# Patient Record
Sex: FEMALE | Employment: UNEMPLOYED | URBAN - METROPOLITAN AREA
[De-identification: names, ages, dates, MRNs, and addresses within clinical notes are randomized per-mention and may not be internally consistent; named-entity substitution may affect disease eponyms.]

---

## 2019-12-10 ENCOUNTER — APPOINTMENT (OUTPATIENT)
Dept: ULTRASOUND IMAGING | Facility: CLINIC | Age: 29
End: 2019-12-10
Attending: EMERGENCY MEDICINE

## 2019-12-10 ENCOUNTER — HOSPITAL ENCOUNTER (EMERGENCY)
Facility: CLINIC | Age: 29
Discharge: HOME | End: 2019-12-10
Attending: EMERGENCY MEDICINE | Admitting: EMERGENCY MEDICINE

## 2019-12-10 VITALS
HEART RATE: 114 BPM | WEIGHT: 168 LBS | TEMPERATURE: 98.4 F | RESPIRATION RATE: 16 BRPM | DIASTOLIC BLOOD PRESSURE: 68 MMHG | OXYGEN SATURATION: 99 % | SYSTOLIC BLOOD PRESSURE: 135 MMHG

## 2019-12-10 DIAGNOSIS — O20.9 VAGINAL BLEEDING IN PREGNANCY, FIRST TRIMESTER: ICD-10-CM

## 2019-12-10 LAB
ANION GAP SERPL CALCULATED.3IONS-SCNC: 2 MMOL/L (ref 3–14)
B-HCG SERPL-ACNC: ABNORMAL IU/L (ref 0–5)
BASOPHILS # BLD AUTO: 0 10E9/L (ref 0–0.2)
BASOPHILS NFR BLD AUTO: 0.5 %
BUN SERPL-MCNC: 5 MG/DL (ref 7–30)
CALCIUM SERPL-MCNC: 9.1 MG/DL (ref 8.5–10.1)
CHLORIDE SERPL-SCNC: 98 MMOL/L (ref 94–109)
CO2 SERPL-SCNC: 30 MMOL/L (ref 20–32)
CREAT SERPL-MCNC: 0.57 MG/DL (ref 0.52–1.04)
DIFFERENTIAL METHOD BLD: NORMAL
EOSINOPHIL # BLD AUTO: 0.1 10E9/L (ref 0–0.7)
EOSINOPHIL NFR BLD AUTO: 0.7 %
ERYTHROCYTE [DISTWIDTH] IN BLOOD BY AUTOMATED COUNT: 12.8 % (ref 10–15)
GFR SERPL CREATININE-BSD FRML MDRD: >90 ML/MIN/{1.73_M2}
GLUCOSE SERPL-MCNC: 153 MG/DL (ref 70–99)
HCT VFR BLD AUTO: 42.6 % (ref 35–47)
HGB BLD-MCNC: 14.1 G/DL (ref 11.7–15.7)
IMM GRANULOCYTES # BLD: 0 10E9/L (ref 0–0.4)
IMM GRANULOCYTES NFR BLD: 0.5 %
LYMPHOCYTES # BLD AUTO: 1.8 10E9/L (ref 0.8–5.3)
LYMPHOCYTES NFR BLD AUTO: 21.1 %
MCH RBC QN AUTO: 28.6 PG (ref 26.5–33)
MCHC RBC AUTO-ENTMCNC: 33.1 G/DL (ref 31.5–36.5)
MCV RBC AUTO: 86 FL (ref 78–100)
MONOCYTES # BLD AUTO: 0.6 10E9/L (ref 0–1.3)
MONOCYTES NFR BLD AUTO: 7.1 %
NEUTROPHILS # BLD AUTO: 6.1 10E9/L (ref 1.6–8.3)
NEUTROPHILS NFR BLD AUTO: 70.1 %
NRBC # BLD AUTO: 0 10*3/UL
NRBC BLD AUTO-RTO: 0 /100
PLATELET # BLD AUTO: 214 10E9/L (ref 150–450)
POTASSIUM SERPL-SCNC: 3.7 MMOL/L (ref 3.4–5.3)
RBC # BLD AUTO: 4.93 10E12/L (ref 3.8–5.2)
SODIUM SERPL-SCNC: 130 MMOL/L (ref 133–144)
WBC # BLD AUTO: 8.7 10E9/L (ref 4–11)

## 2019-12-10 PROCEDURE — 80048 BASIC METABOLIC PNL TOTAL CA: CPT | Performed by: EMERGENCY MEDICINE

## 2019-12-10 PROCEDURE — 84702 CHORIONIC GONADOTROPIN TEST: CPT | Performed by: EMERGENCY MEDICINE

## 2019-12-10 PROCEDURE — 76801 OB US < 14 WKS SINGLE FETUS: CPT

## 2019-12-10 PROCEDURE — 85025 COMPLETE CBC W/AUTO DIFF WBC: CPT | Performed by: EMERGENCY MEDICINE

## 2019-12-10 PROCEDURE — 99284 EMERGENCY DEPT VISIT MOD MDM: CPT | Mod: Z6 | Performed by: EMERGENCY MEDICINE

## 2019-12-10 PROCEDURE — 99284 EMERGENCY DEPT VISIT MOD MDM: CPT | Mod: 25

## 2019-12-10 RX ORDER — CHOLECALCIFEROL (VITAMIN D3) 50 MCG
1 TABLET ORAL DAILY
COMMUNITY

## 2019-12-10 RX ORDER — MECLIZINE HYDROCHLORIDE 25 MG/1
25 TABLET ORAL 3 TIMES DAILY PRN
COMMUNITY

## 2019-12-10 RX ORDER — FOLIC ACID 1 MG/1
1 TABLET ORAL DAILY
COMMUNITY

## 2019-12-10 ASSESSMENT — ENCOUNTER SYMPTOMS
EYES NEGATIVE: 1
PSYCHIATRIC NEGATIVE: 1
RESPIRATORY NEGATIVE: 1
NEUROLOGICAL NEGATIVE: 1
HEMATOLOGIC/LYMPHATIC NEGATIVE: 1
CONSTITUTIONAL NEGATIVE: 1
MUSCULOSKELETAL NEGATIVE: 1
ALLERGIC/IMMUNOLOGIC NEGATIVE: 1
GASTROINTESTINAL NEGATIVE: 1
CARDIOVASCULAR NEGATIVE: 1
ENDOCRINE NEGATIVE: 1

## 2019-12-10 NOTE — ED AVS SNAPSHOT
[facility] Emergency Department  [address] [address]  Phone:  [phone]  Fax:  [phone]                                    Rissa Spencer   MRN: [id number]    Department:  [facility] Emergency Department   Date of Visit:  12/10/2019           After Visit Summary Signature Page    I have received my discharge instructions, and my questions have been answered. I have discussed any challenges I see with this plan with the nurse or doctor.    ..........................................................................................................................................  Patient/Patient Representative Signature      ..........................................................................................................................................  Patient Representative Print Name and Relationship to Patient    ..................................................               ................................................  Date                                   Time    ..........................................................................................................................................  Reviewed by Signature/Title    ...................................................              ..............................................  Date                                               Time          22EPIC Rev 08/18

## 2019-12-10 NOTE — ED NOTES
approx  9 weeks preg with vaginal bleeding last week, and again today about 1 hr PTA. MD at bedside.

## 2019-12-10 NOTE — ED PROVIDER NOTES
History     Chief Complaint   Patient presents with     Vaginal Bleeding     patient states she is 9weeks pregnant with bleeding     HPI  Josephine Peñaloza is a 29 year old female who presents to the emergency room for vaginal bleeding. Patient states that she is 9 weeks pregnant with twins and started bleeding 1 hour ago. Patient states that she had a moderate amount of bleeding. Patient states that she was seen at [facility] on Wednesday for same complaints and states that the bleeding stopped after Wednesday and started again today. Patient had an ultrasound when she was seen at [facility] that was normal. Patient denies nausea, vomiting, abdominal pain, chest pain, SOB or any other complaints at this time. G-5,P-2, AB-2. Patient's blood type is A-positive.  History was limited due to language barrier.  She is Armenian-speaking originally from [place] accompanied by private car with her sister-in-law Magdalena and father of mitch Salomon.  Patient has been assessed at the [facility] and is in the process of establishing care with OB/GYN as she reports she is carrying twins.      Social History: Patient presents to ED via private vehicle. Patient lives in [place], [place]    Allergies:  No Known Allergies    Problem List:    There are no active problems to display for this patient.       Past Medical History:    No past medical history on file.    Past Surgical History:    No past surgical history on file.    Family History:    No family history on file.    Social History:  Marital Status:   [2]  Social History     Tobacco Use     Smoking status: Not on file   Substance Use Topics     Alcohol use: Not on file     Drug use: Not on file        Medications:    folic acid (FOLVITE) 1 MG tablet  meclizine (ANTIVERT) 25 MG tablet  vitamin D3 (CHOLECALCIFEROL) 2000 units (50 mcg) tablet          Review of Systems   Constitutional: Negative.    HENT: Negative.    Eyes: Negative.    Respiratory: Negative.     Cardiovascular: Negative.    Gastrointestinal: Negative.    Endocrine: Negative.    Genitourinary: Positive for vaginal bleeding.   Musculoskeletal: Negative.    Skin: Negative.    Allergic/Immunologic: Negative.    Neurological: Negative.    Hematological: Negative.    Psychiatric/Behavioral: Negative.    All other systems reviewed and are negative.      Physical Exam   BP: 135/68  Pulse: 114  Temp: 98.4  F (36.9  C)  Resp: 16  Weight: 76.2 kg (168 lb)  SpO2: 99 %      Physical Exam  Exam conducted with a chaperone present.   Constitutional:       General: She is not in acute distress.     Appearance: She is not ill-appearing, toxic-appearing or diaphoretic.   HENT:      Head: Normocephalic and atraumatic.      Right Ear: There is no impacted cerumen.      Left Ear: There is no impacted cerumen.      Nose: Nose normal. No congestion or rhinorrhea.      Mouth/Throat:      Mouth: Mucous membranes are moist.      Pharynx: No oropharyngeal exudate or posterior oropharyngeal erythema.   Eyes:      General: No scleral icterus.        Right eye: No discharge.         Left eye: No discharge.      Extraocular Movements: Extraocular movements intact.      Conjunctiva/sclera: Conjunctivae normal.      Pupils: Pupils are equal, round, and reactive to light.   Neck:      Musculoskeletal: Normal range of motion. No neck rigidity or muscular tenderness.      Vascular: No carotid bruit.   Cardiovascular:      Rate and Rhythm: Normal rate.      Heart sounds: Normal heart sounds. No murmur. No friction rub. No gallop.    Pulmonary:      Effort: No respiratory distress.      Breath sounds: No stridor. No wheezing, rhonchi or rales.   Chest:      Chest wall: No tenderness.   Abdominal:      General: Abdomen is flat. There is no distension.      Palpations: There is no mass.      Tenderness: There is no abdominal tenderness. There is no right CVA tenderness, left CVA tenderness, guarding or rebound.      Hernia: No hernia is present.    Genitourinary:     General: Normal vulva.      Labia:         Right: No rash, tenderness, lesion or injury.         Left: No rash, tenderness, lesion or injury.       Vagina: No foreign body. Bleeding (scant) present. No tenderness or lesions.      Cervix: Cervical bleeding present. No discharge, lesion or erythema.   Musculoskeletal:         General: No swelling, tenderness, deformity or signs of injury.      Right lower leg: No edema.      Left lower leg: No edema.   Lymphadenopathy:      Cervical: No cervical adenopathy.   Skin:     Capillary Refill: Capillary refill takes less than 2 seconds.      Coloration: Skin is not jaundiced or pale.      Findings: No bruising, erythema, lesion or rash.   Neurological:      Mental Status: She is alert.      Cranial Nerves: No cranial nerve deficit.      Sensory: No sensory deficit.      Motor: No weakness.      Coordination: Coordination normal.      Gait: Gait normal.      Deep Tendon Reflexes: Reflexes normal.   Psychiatric:         Mood and Affect: Mood normal.         Behavior: Behavior normal.         Thought Content: Thought content normal.         Judgment: Judgment normal.         ED Course        Procedures               Critical Care time:  none               ED medications: none      ED Vitals:  Vitals:    12/10/19 1745 12/10/19 1812   BP: 135/68    Pulse: 114    Resp: 16    Temp: 98.4  F (36.9  C)    TempSrc: Oral    SpO2: 99%    Weight:  76.2 kg (168 lb)     Prior or recent diagnostic imaging and work-up:  Sherlyn Butler MD     12/4/2019  3:03 AM  [facility]  Point of Care Ultrasound Interpretation    POC US OB FIRST TRIMESTER  Date/Time: 12/4/2019 3:03 AM  Performed by: Sherlyn Butler MD  Authorized by: Gonzalez Loaiza MD      Point of Care Ultrasound: Obstetrics- Emergency Medicine    Indications: Vaginal Bleeding    Window: Adequate for full imaging and interpretation    Finding/ Impression (Within the context of limited point-of-care   ultrasound):  Findings consistent with: two gestational sacs both with   cardiac activity    MRN: [id number]  Rissa Spencer  Obstetrics Report Date: 2019         [facility]ound  [address] [place] [address]  Dept Phone: [phone]  Dept Fax: [phone]      Patient Information      MRN: [id number]  Name: Rissa Spencer  : 1990 (29 y.o.) (F)   Date: 2019 11:48 AM      Performed By    Performed by: Marry Soto RDMS  Attending: Magdiel Betancourt MD Referred by: AMY Trammell CNM  Referring location: SP      Procedure   OBGYN FIRST TRIMESTER ULTRASOUND     Indications   Establish gestational age, ultrasound  Dichorionic diamniotic twin pregnancy in first trimester     Gestational Age   LMP: 10/02/2019  GA by Today's US: Fetus 1: 7w5d  Fetus 2: 7w5d    Working GA: 7w2d Working PETER: 2020, by Last Menstrual Period       Maternal Evaluation   Cervix Normal   Approach Transabdominal, Endovaginal   Endovaginal scan done to better assess Early gestation   Cul-de-sac No fluid seen   Uterus Normal   Right Ovary Normal   Left Ovary Normal   Right Adnexa Normal   Left Adnexa Normal                             Ultrasound Findings   Fetus 1  Fetal Evaluation   Cardiac Activity Present   Chorionicity Dichorionic   Amnionicity Diamniotic   Gestation Type Twin pregnancy                  First Trimester   Gestational Sac Normal   Fetal Pole Present   Yolk Sac Seen                Biometry  Fetal Heart Rate:   153 bpm       Yolk Sac 0.21 cm     CRL 1.4 cm > 99% 7w5d     Estimated FW: No EFW calculated for this study                                                                               Fetus 2  Fetal Evaluation   Cardiac Activity Present   Chorionicity Dichorionic   Amnionicity Diamniotic   Gestation Type Twin pregnancy                  First Trimester   Gestational Sac Normal   Fetal Pole Present   Yolk Sac Seen                Biometry  Fetal Heart Rate:       Yolk Sac 0.39 cm     CRL 1.4 cm > 99% 7w5d     Estimated FW: No EFW calculated for this study                                                                         Sonographer Comments   GA by Ultrasound is Fetus 1: 7w5d  Fetus 2: 7w5d +/- 5 days. (PETER by Ultrasound is 7/5/20)  Clinical PETER Estimated Date of Delivery: 7/8/20 (by LMP 10/2/19) = 7w2d      Impression   Diamnionic/Dichorionic twin live Intrauterine pregnancy at Fetus 1: 7w5d  Fetus 2: 7w5d +/- 5 days by today's ultrasound.   PETER by ultrasound today is 7/5/20, which is consistent with LMP Estimated   Date of Delivery: 7/8/20.  Recommend using PETER of 7/8/20.   Normal appearing uterus and adnexa.     Please refer to updated Dating section.   Primary provider to review final PETER.   Diamnionic/Dichorionic twins     Recommendations   The patient is informed of the ultrasound findings by the physician and   will follow up with her obstetrical provider.      Thank you for sharing with us in the care of Rissa Spencer.   Please do not hesitate to call if you have any questions at the following   numbers:  [place] [phone], [place] [phone], [place] call [phone]     Magdiel Betancourt MD      ED Labs and imaging:  Results for orders placed or performed during the hospital encounter of 12/10/19 (from the past 24 hour(s))   CBC with platelets differential   Result Value Ref Range    WBC 8.7 4.0 - 11.0 10e9/L    RBC Count 4.93 3.8 - 5.2 10e12/L    Hemoglobin 14.1 11.7 - 15.7 g/dL    Hematocrit 42.6 35.0 - 47.0 %    MCV 86 78 - 100 fl    MCH 28.6 26.5 - 33.0 pg    MCHC 33.1 31.5 - 36.5 g/dL    RDW 12.8 10.0 - 15.0 %    Platelet Count 214 150 - 450 10e9/L    Diff Method Automated Method     % Neutrophils 70.1 %    % Lymphocytes 21.1 %    % Monocytes 7.1 %    % Eosinophils 0.7 %    % Basophils 0.5 %    % Immature Granulocytes 0.5 %    Nucleated RBCs 0 0 /100    Absolute Neutrophil 6.1 1.6 - 8.3 10e9/L    Absolute  Lymphocytes 1.8 0.8 - 5.3 10e9/L    Absolute Monocytes 0.6 0.0 - 1.3 10e9/L    Absolute Eosinophils 0.1 0.0 - 0.7 10e9/L    Absolute Basophils 0.0 0.0 - 0.2 10e9/L    Abs Immature Granulocytes 0.0 0 - 0.4 10e9/L    Absolute Nucleated RBC 0.0    Basic metabolic panel   Result Value Ref Range    Sodium 130 (L) 133 - 144 mmol/L    Potassium 3.7 3.4 - 5.3 mmol/L    Chloride 98 94 - 109 mmol/L    Carbon Dioxide 30 20 - 32 mmol/L    Anion Gap 2 (L) 3 - 14 mmol/L    Glucose 153 (H) 70 - 99 mg/dL    Urea Nitrogen 5 (L) 7 - 30 mg/dL    Creatinine 0.57 0.52 - 1.04 mg/dL    GFR Estimate >90 >60 mL/min/[1.73_m2]    GFR Estimate If Black >90 >60 mL/min/[1.73_m2]    Calcium 9.1 8.5 - 10.1 mg/dL   HCG quantitative pregnancy (blood)   Result Value Ref Range    HCG Quantitative Serum 123,137 (H) 0 - 5 IU/L   US OB < 14 Weeks Mulitple    Narrative    OBSTETRIC ULTRASOUND LESS THAN 14 WEEKS MULTIPLE   12/10/2019 7:17 PM     HISTORY: , vaginal bleeding in first trimester of pregnancy. Twin  gestation. Evaluate for intrauterine pregnancy, subchorionic  hemorrhage or other placental anomalies vs other acute process.    COMPARISON: None.    FINDINGS:  Twin living intrauterine pregnancies identified. Two gestational sacs  are identified each containing a fetal pole. Baby A measures 10 weeks  1 day gestational age. Baby B measures 10 weeks 3 days gestational  age. Fetal cardiac activity identified arising from each fetal pole.  Estimated date of delivery ranges from 2020 to 2020.    There are 2 subchorionic hemorrhages identified. One towards the right  is 2.0 x 0.8 x 2.5 cm. Another towards the left is 1.8 x 0.8 x 2.1 cm.    Right ovary is unremarkable. Left ovarian corpus luteum versus cyst is  2.2 cm.    No free pelvic fluid. No suspicious adnexal mass.      Impression    IMPRESSION:  1. Twin living intrauterine gestations identified. Fetal cardiac  activity identified arising from each fetal pole.  2. Two subchorionic  "hemorrhages noted.       TERESA DESIR MD               Assessments & Plan (with Medical Decision Making)   Clinical impression:29-year-old female G5, P2  now at 10 weeks, 1 day gestation who presented with acute vaginal bleeding without abdominal pain.  The cause of her symptoms is not clear may be related to subchorionic hemorrhage.  She is given precaution for threatened miscarriage, and close outpatient follow-up including establishing care with gynecology for twin pregnancy.  .  Patient presented to [facility] with an episode of vaginal bleeding- 6 days prior. She was evaluated on December 4, 2019.  She was ultimately discharged after a reassuring point of care ultrasound.  Patient reported a history of misccarriage x2 one in 2010 and one in 2014.  She reports since her visit at [facility]  no bleeding or spotting until  this evening about an hour prior to arrival in the department she began to bleed and reports bleeding was \"bright red\".  There was no clots or tissue. Pregnant with twins and reports that she thinks it is a dichorionic diamniotic pregnancy based on her ultrasound exam.  History was limited due to language barrier as patient speaks Georgian.History  obtained with the aid of a medical  using the iPad.  She reported no abdominal pain.  Blood type is A+.  She is on prenatals and folic acid but no other active medications and no medication allergies.  She was hemodynamically normal on arrival heart rate in triage was tachycardic likely due to anxiety from arrival in the department blood pressure is 135/68 she was afebrile. Chaperoned pelvic exam with Anni Christie (ED ERT) scant bleeding in the vaginal vault no tissue.  No labial lesions or lacerations.  The cervix is closed. Bimanual exam was deferred.    ED Course and Plan:  I reviewed her discharge summary (AVS) and ultrasound results completed at [facility] through [facility]. Ultrasound from November 22, 2019 did " confirm that patient has a diamniotic dichorionic twins with estimated date of delivery July 8, 2020. See detail in report above.  Work-up in the department today revealed a sodium of 130.  Blood glucose- 153.  Patient's serum quant hCG is 409496.  Patient did not want pelvic or transvaginal ultrasound.  Transabdominal ultrasound did confirm  live Twin intrauterine gestations with fetal activity noted , subchorionic hemorrhage is noted (see measurements above).  The estimated delivery was consistent with recent ultrasound completed on November 2019.  Gestational age of 10 weeks and 3 days. See additional details in the interpreting radiologist report above.    Patient requested that  her sister-in-law Magdalena translate towards the end of the ED visit.  She reports she is yet to establish OB care.  She had been followed by the [facility] and is going to be referred for further care with a gynecologist now that she has twin pregnancy.  She was counseled that although her exam today suggests she is still pregnant she is at risk for miscarriage.  She is counseled on ultrasound findings subchorionic hemorrhage and miscarriage.  Close outpatient follow-up reviewed she is also informed of reasons to return to the department for care she expressed understanding, comfort and agreement of plan of care.      Disclaimer: This note consists of symbols derived from keyboarding, dictation and/or voice recognition software. As a result, there may be errors in the script that have gone undetected. Please consider this when interpreting information found in this chart.  I have reviewed the nursing notes.    I have reviewed the findings, diagnosis, plan and need for follow up with the patient.       Discharge Medication List as of 12/10/2019  8:29 PM          Final diagnoses:   Vaginal bleeding in pregnancy, first trimester - twin gestation (Diamnionic, Dichorionic), with subchorionic hemorrhages noted on  ultrasound     This document serves as a record of the services and decisions personally performed and made by Rodolfo Cedeno, *. It was created on HIS/HER behalf by Anne Marie Lara, a trained medical scribe. The creation of this document is based the provider's statements to the medical scribe.  Anne Marie Lara 5:51 PM 12/10/2019    Provider:   The information in this document, created by the medical scribe for me, accurately reflects the services I personally performed and the decisions made by me. I have reviewed and approved this document for accuracy prior to leaving the patient care area.  Rodolfo Cedeno, Iesha 5:51 PM 12/10/2019    12/10/2019   [facility] EMERGENCY DEPARTMENT     Rodolfo Cedeno MD  12/11/19 0058

## 2019-12-11 NOTE — DISCHARGE INSTRUCTIONS
1) Ultrasound shows live twin gestation. Due date 7/4 or 7/6 /2020    2) rest, avoid stress,no  heavy lifting , no sex    3) if bleeding becomes heavy, fainting spell, abdominal pain or pressure return to be reexamined